# Patient Record
Sex: MALE | Race: OTHER | NOT HISPANIC OR LATINO | Employment: FULL TIME | URBAN - METROPOLITAN AREA
[De-identification: names, ages, dates, MRNs, and addresses within clinical notes are randomized per-mention and may not be internally consistent; named-entity substitution may affect disease eponyms.]

---

## 2024-10-02 ENCOUNTER — HOSPITAL ENCOUNTER (EMERGENCY)
Facility: HOSPITAL | Age: 34
Discharge: HOME | End: 2024-10-02
Attending: EMERGENCY MEDICINE
Payer: COMMERCIAL

## 2024-10-02 VITALS
BODY MASS INDEX: 23.58 KG/M2 | WEIGHT: 159.19 LBS | TEMPERATURE: 98 F | SYSTOLIC BLOOD PRESSURE: 122 MMHG | OXYGEN SATURATION: 100 % | HEART RATE: 72 BPM | RESPIRATION RATE: 18 BRPM | DIASTOLIC BLOOD PRESSURE: 64 MMHG | HEIGHT: 69 IN

## 2024-10-02 DIAGNOSIS — N20.0 KIDNEY STONE: ICD-10-CM

## 2024-10-02 DIAGNOSIS — R10.9 FLANK PAIN: Primary | ICD-10-CM

## 2024-10-02 LAB
ALBUMIN SERPL BCP-MCNC: 4.3 G/DL (ref 3.5–5.2)
ALP SERPL-CCNC: 77 U/L (ref 55–135)
ALT SERPL W/O P-5'-P-CCNC: 36 U/L (ref 10–44)
ANION GAP SERPL CALC-SCNC: 13 MMOL/L (ref 8–16)
AST SERPL-CCNC: 32 U/L (ref 10–40)
BASOPHILS # BLD AUTO: 0.02 K/UL (ref 0–0.2)
BASOPHILS NFR BLD: 0.3 % (ref 0–1.9)
BILIRUB SERPL-MCNC: 0.6 MG/DL (ref 0.1–1)
BILIRUB UR QL STRIP: NEGATIVE
BUN SERPL-MCNC: 24 MG/DL (ref 6–20)
CALCIUM SERPL-MCNC: 9.6 MG/DL (ref 8.7–10.5)
CHLORIDE SERPL-SCNC: 103 MMOL/L (ref 95–110)
CLARITY UR: CLEAR
CO2 SERPL-SCNC: 21 MMOL/L (ref 23–29)
COLOR UR: YELLOW
CREAT SERPL-MCNC: 1.3 MG/DL (ref 0.5–1.4)
DIFFERENTIAL METHOD BLD: ABNORMAL
EOSINOPHIL # BLD AUTO: 0.3 K/UL (ref 0–0.5)
EOSINOPHIL NFR BLD: 4.4 % (ref 0–8)
ERYTHROCYTE [DISTWIDTH] IN BLOOD BY AUTOMATED COUNT: 12 % (ref 11.5–14.5)
EST. GFR  (NO RACE VARIABLE): >60 ML/MIN/1.73 M^2
GLUCOSE SERPL-MCNC: 128 MG/DL (ref 70–110)
GLUCOSE UR QL STRIP: NEGATIVE
HCT VFR BLD AUTO: 42.8 % (ref 40–54)
HCV AB SERPL QL IA: NEGATIVE
HEP C VIRUS HOLD SPECIMEN: NORMAL
HGB BLD-MCNC: 14.6 G/DL (ref 14–18)
HGB UR QL STRIP: ABNORMAL
HIV 1+2 AB+HIV1 P24 AG SERPL QL IA: NEGATIVE
IMM GRANULOCYTES # BLD AUTO: 0.01 K/UL (ref 0–0.04)
IMM GRANULOCYTES NFR BLD AUTO: 0.1 % (ref 0–0.5)
KETONES UR QL STRIP: ABNORMAL
LEUKOCYTE ESTERASE UR QL STRIP: NEGATIVE
LYMPHOCYTES # BLD AUTO: 3.1 K/UL (ref 1–4.8)
LYMPHOCYTES NFR BLD: 45.2 % (ref 18–48)
MCH RBC QN AUTO: 31.6 PG (ref 27–31)
MCHC RBC AUTO-ENTMCNC: 34.1 G/DL (ref 32–36)
MCV RBC AUTO: 93 FL (ref 82–98)
MICROSCOPIC COMMENT: ABNORMAL
MONOCYTES # BLD AUTO: 0.4 K/UL (ref 0.3–1)
MONOCYTES NFR BLD: 5.6 % (ref 4–15)
NEUTROPHILS # BLD AUTO: 3 K/UL (ref 1.8–7.7)
NEUTROPHILS NFR BLD: 44.4 % (ref 38–73)
NITRITE UR QL STRIP: NEGATIVE
NRBC BLD-RTO: 0 /100 WBC
PH UR STRIP: 7 [PH] (ref 5–8)
PLATELET # BLD AUTO: 286 K/UL (ref 150–450)
PMV BLD AUTO: 9.4 FL (ref 9.2–12.9)
POTASSIUM SERPL-SCNC: 4.3 MMOL/L (ref 3.5–5.1)
PROT SERPL-MCNC: 8.2 G/DL (ref 6–8.4)
PROT UR QL STRIP: NEGATIVE
RBC # BLD AUTO: 4.62 M/UL (ref 4.6–6.2)
RBC #/AREA URNS HPF: 62 /HPF (ref 0–4)
SODIUM SERPL-SCNC: 137 MMOL/L (ref 136–145)
SP GR UR STRIP: 1.02 (ref 1–1.03)
URN SPEC COLLECT METH UR: ABNORMAL
UROBILINOGEN UR STRIP-ACNC: NEGATIVE EU/DL
WBC # BLD AUTO: 6.84 K/UL (ref 3.9–12.7)
WBC #/AREA URNS HPF: 2 /HPF (ref 0–5)

## 2024-10-02 PROCEDURE — 87389 HIV-1 AG W/HIV-1&-2 AB AG IA: CPT | Performed by: EMERGENCY MEDICINE

## 2024-10-02 PROCEDURE — 86803 HEPATITIS C AB TEST: CPT | Performed by: EMERGENCY MEDICINE

## 2024-10-02 PROCEDURE — 36415 COLL VENOUS BLD VENIPUNCTURE: CPT | Performed by: EMERGENCY MEDICINE

## 2024-10-02 PROCEDURE — 25000003 PHARM REV CODE 250: Performed by: EMERGENCY MEDICINE

## 2024-10-02 PROCEDURE — 85025 COMPLETE CBC W/AUTO DIFF WBC: CPT | Performed by: EMERGENCY MEDICINE

## 2024-10-02 PROCEDURE — 80053 COMPREHEN METABOLIC PANEL: CPT | Performed by: EMERGENCY MEDICINE

## 2024-10-02 PROCEDURE — 81000 URINALYSIS NONAUTO W/SCOPE: CPT | Performed by: EMERGENCY MEDICINE

## 2024-10-02 PROCEDURE — 96374 THER/PROPH/DIAG INJ IV PUSH: CPT

## 2024-10-02 PROCEDURE — 96361 HYDRATE IV INFUSION ADD-ON: CPT

## 2024-10-02 PROCEDURE — 63600175 PHARM REV CODE 636 W HCPCS: Performed by: EMERGENCY MEDICINE

## 2024-10-02 PROCEDURE — 99285 EMERGENCY DEPT VISIT HI MDM: CPT | Mod: 25

## 2024-10-02 PROCEDURE — 96375 TX/PRO/DX INJ NEW DRUG ADDON: CPT

## 2024-10-02 RX ORDER — MORPHINE SULFATE 4 MG/ML
4 INJECTION, SOLUTION INTRAMUSCULAR; INTRAVENOUS
Status: DISCONTINUED | OUTPATIENT
Start: 2024-10-02 | End: 2024-10-02 | Stop reason: HOSPADM

## 2024-10-02 RX ORDER — TAMSULOSIN HYDROCHLORIDE 0.4 MG/1
0.4 CAPSULE ORAL DAILY
Qty: 30 CAPSULE | Refills: 0 | Status: SHIPPED | OUTPATIENT
Start: 2024-10-02 | End: 2025-10-02

## 2024-10-02 RX ORDER — HYDROCODONE BITARTRATE AND ACETAMINOPHEN 5; 325 MG/1; MG/1
1 TABLET ORAL EVERY 4 HOURS PRN
Qty: 11 TABLET | Refills: 0 | Status: SHIPPED | OUTPATIENT
Start: 2024-10-02 | End: 2024-10-07

## 2024-10-02 RX ORDER — TAMSULOSIN HYDROCHLORIDE 0.4 MG/1
0.4 CAPSULE ORAL
Status: COMPLETED | OUTPATIENT
Start: 2024-10-02 | End: 2024-10-02

## 2024-10-02 RX ORDER — KETOROLAC TROMETHAMINE 30 MG/ML
15 INJECTION, SOLUTION INTRAMUSCULAR; INTRAVENOUS
Status: COMPLETED | OUTPATIENT
Start: 2024-10-02 | End: 2024-10-02

## 2024-10-02 RX ORDER — ONDANSETRON HYDROCHLORIDE 2 MG/ML
4 INJECTION, SOLUTION INTRAVENOUS
Status: COMPLETED | OUTPATIENT
Start: 2024-10-02 | End: 2024-10-02

## 2024-10-02 RX ORDER — MORPHINE SULFATE 4 MG/ML
4 INJECTION, SOLUTION INTRAMUSCULAR; INTRAVENOUS
Status: COMPLETED | OUTPATIENT
Start: 2024-10-02 | End: 2024-10-02

## 2024-10-02 RX ORDER — PROMETHAZINE HYDROCHLORIDE 25 MG/1
25 TABLET ORAL EVERY 6 HOURS PRN
Qty: 15 TABLET | Refills: 0 | Status: SHIPPED | OUTPATIENT
Start: 2024-10-02

## 2024-10-02 RX ADMIN — SODIUM CHLORIDE 1000 ML: 9 INJECTION, SOLUTION INTRAVENOUS at 11:10

## 2024-10-02 RX ADMIN — KETOROLAC TROMETHAMINE 15 MG: 30 INJECTION, SOLUTION INTRAMUSCULAR at 11:10

## 2024-10-02 RX ADMIN — MORPHINE SULFATE 4 MG: 4 INJECTION INTRAVENOUS at 12:10

## 2024-10-02 RX ADMIN — ONDANSETRON 4 MG: 2 INJECTION INTRAMUSCULAR; INTRAVENOUS at 11:10

## 2024-10-02 RX ADMIN — TAMSULOSIN HYDROCHLORIDE 0.4 MG: 0.4 CAPSULE ORAL at 01:10

## 2024-10-02 NOTE — ED PROVIDER NOTES
SCRIBE #1 NOTE: I, Sarah Garland, am scribing for, and in the presence of, Jaguar Aquino MD. I have scribed the entire note.       History     Chief Complaint   Patient presents with    Flank Pain     Patient presents to ED with c/o right sided flank pain that stated this morning. Patient also nauseous. Denies difficulty urinating.     Review of patient's allergies indicates:  No Known Allergies      History of Present Illness     HPI    10/2/2024, 11:43 AM  History obtained from the patient      History of Present Illness: Sandra Tafoya is a 34 y.o. male patient with an unknown PMHx who presents to the Emergency Department for evaluation of right-sided flank pain which onset 2 hours ago. Symptoms are constant and moderate in severity. No mitigating or exacerbating factors reported. Associated sxs include nausea, however, Pt denies any vomiting. No prior treatment specified. No further complaints or concerns at this time.       Arrival mode: Personal Transportation    PCP: Rekha, Primary Doctor        Past Medical History:  History reviewed. No pertinent past medical history.    Past Surgical History:  History reviewed. No pertinent surgical history.      Family History:  No family history on file.    Social History:  Social History     Tobacco Use    Smoking status: Never     Passive exposure: Never    Smokeless tobacco: Never   Substance and Sexual Activity    Alcohol use: Not on file    Drug use: Not Currently    Sexual activity: Not on file        Review of Systems     Review of Systems   Constitutional:  Negative for fever.   HENT:  Negative for sore throat.    Respiratory:  Negative for shortness of breath.    Cardiovascular:  Negative for chest pain.   Gastrointestinal:  Positive for nausea. Negative for vomiting.   Genitourinary:  Positive for flank pain (right-sided). Negative for dysuria.   Musculoskeletal:  Negative for back pain.   Skin:  Negative for rash.   Neurological:  Negative for weakness.  "  Hematological:  Does not bruise/bleed easily.   All other systems reviewed and are negative.     Physical Exam     Initial Vitals [10/02/24 1138]   BP Pulse Resp Temp SpO2   128/69 65 18 98.3 °F (36.8 °C) 100 %      MAP       --          Physical Exam  Nursing Notes and Vital Signs Reviewed.  Constitutional: Patient is in moderate distress. Well-developed and well-nourished.  Head: Atraumatic. Normocephalic.  Eyes: PERRL. EOM intact. Conjunctivae are not pale. No scleral icterus.  ENT: Mucous membranes are moist. Oropharynx is clear and symmetric.    Neck: Supple. Full ROM. No lymphadenopathy.  Cardiovascular: Regular rate. Regular rhythm. No murmurs, rubs, or gallops. Distal pulses are 2+ and symmetric.  Pulmonary/Chest: No respiratory distress. Clear to auscultation bilaterally. No wheezing or rales.  Abdominal: Soft and non-distended.  There is no tenderness.  No rebound, guarding, or rigidity. Good bowel sounds.  Genitourinary: No CVA tenderness  Musculoskeletal: Moves all extremities. No obvious deformities. No edema. No calf tenderness.  Skin: Warm and dry.  Neurological:  Alert, awake, and appropriate.  Normal speech.  No acute focal neurological deficits are appreciated.  Psychiatric: Normal affect. Good eye contact. Appropriate in content.     ED Course   Procedures  ED Vital Signs:  Vitals:    10/02/24 1138 10/02/24 1149 10/02/24 1226 10/02/24 1258   BP: 128/69 (!) 120/57 112/69 129/67   Pulse: 65 (!) 58 (!) 59 (!) 58   Resp: 18 16 18 16   Temp: 98.3 °F (36.8 °C) 98.2 °F (36.8 °C) 98.2 °F (36.8 °C) 98 °F (36.7 °C)   TempSrc: Oral Oral Oral Oral   SpO2: 100% 100%     Weight:  72.2 kg (159 lb 2.8 oz)     Height: 5' 9" (1.753 m) 5' 9" (1.753 m)      10/02/24 1330 10/02/24 1505   BP: 118/63 122/64   Pulse: 64 72   Resp: 16 18   Temp:  98.2 °F (36.8 °C)   TempSrc:  Oral   SpO2: 100% 100%   Weight:     Height:         Abnormal Lab Results:  Labs Reviewed   CBC W/ AUTO DIFFERENTIAL - Abnormal       Result Value "    WBC 6.84      RBC 4.62      Hemoglobin 14.6      Hematocrit 42.8      MCV 93      MCH 31.6 (*)     MCHC 34.1      RDW 12.0      Platelets 286      MPV 9.4      Immature Granulocytes 0.1      Gran # (ANC) 3.0      Immature Grans (Abs) 0.01      Lymph # 3.1      Mono # 0.4      Eos # 0.3      Baso # 0.02      nRBC 0      Gran % 44.4      Lymph % 45.2      Mono % 5.6      Eosinophil % 4.4      Basophil % 0.3      Differential Method Automated      Narrative:     Release to patient->Immediate   COMPREHENSIVE METABOLIC PANEL - Abnormal    Sodium 137      Potassium 4.3      Chloride 103      CO2 21 (*)     Glucose 128 (*)     BUN 24 (*)     Creatinine 1.3      Calcium 9.6      Total Protein 8.2      Albumin 4.3      Total Bilirubin 0.6      Alkaline Phosphatase 77      AST 32      ALT 36      eGFR >60      Anion Gap 13      Narrative:     Release to patient->Immediate   URINALYSIS, REFLEX TO URINE CULTURE - Abnormal    Specimen UA Urine, Clean Catch      Color, UA Yellow      Appearance, UA Clear      pH, UA 7.0      Specific Gravity, UA 1.020      Protein, UA Negative      Glucose, UA Negative      Ketones, UA 2+ (*)     Bilirubin (UA) Negative      Occult Blood UA 1+ (*)     Nitrite, UA Negative      Urobilinogen, UA Negative      Leukocytes, UA Negative      Narrative:     Specimen Source->Urine   URINALYSIS MICROSCOPIC - Abnormal    RBC, UA 62 (*)     WBC, UA 2      Microscopic Comment SEE COMMENT      Narrative:     Specimen Source->Urine   HIV 1 / 2 ANTIBODY    HIV 1/2 Ag/Ab Negative      Narrative:     Release to patient->Immediate   HEPATITIS C ANTIBODY    Hepatitis C Ab Negative      Narrative:     Release to patient->Immediate   HEP C VIRUS HOLD SPECIMEN    HEP C Virus Hold Specimen Hold for HCV sendout      Narrative:     Release to patient->Immediate        All Lab Results:  Results for orders placed or performed during the hospital encounter of 10/02/24   HIV 1/2 Ag/Ab (4th Gen)    Collection Time:  10/02/24 11:51 AM   Result Value Ref Range    HIV 1/2 Ag/Ab Negative Negative   Hepatitis C Antibody    Collection Time: 10/02/24 11:51 AM   Result Value Ref Range    Hepatitis C Ab Negative Negative   HCV Virus Hold Specimen    Collection Time: 10/02/24 11:51 AM   Result Value Ref Range    HEP C Virus Hold Specimen Hold for HCV sendout    CBC Auto Differential    Collection Time: 10/02/24 11:51 AM   Result Value Ref Range    WBC 6.84 3.90 - 12.70 K/uL    RBC 4.62 4.60 - 6.20 M/uL    Hemoglobin 14.6 14.0 - 18.0 g/dL    Hematocrit 42.8 40.0 - 54.0 %    MCV 93 82 - 98 fL    MCH 31.6 (H) 27.0 - 31.0 pg    MCHC 34.1 32.0 - 36.0 g/dL    RDW 12.0 11.5 - 14.5 %    Platelets 286 150 - 450 K/uL    MPV 9.4 9.2 - 12.9 fL    Immature Granulocytes 0.1 0.0 - 0.5 %    Gran # (ANC) 3.0 1.8 - 7.7 K/uL    Immature Grans (Abs) 0.01 0.00 - 0.04 K/uL    Lymph # 3.1 1.0 - 4.8 K/uL    Mono # 0.4 0.3 - 1.0 K/uL    Eos # 0.3 0.0 - 0.5 K/uL    Baso # 0.02 0.00 - 0.20 K/uL    nRBC 0 0 /100 WBC    Gran % 44.4 38.0 - 73.0 %    Lymph % 45.2 18.0 - 48.0 %    Mono % 5.6 4.0 - 15.0 %    Eosinophil % 4.4 0.0 - 8.0 %    Basophil % 0.3 0.0 - 1.9 %    Differential Method Automated    Comprehensive Metabolic Panel    Collection Time: 10/02/24 11:51 AM   Result Value Ref Range    Sodium 137 136 - 145 mmol/L    Potassium 4.3 3.5 - 5.1 mmol/L    Chloride 103 95 - 110 mmol/L    CO2 21 (L) 23 - 29 mmol/L    Glucose 128 (H) 70 - 110 mg/dL    BUN 24 (H) 6 - 20 mg/dL    Creatinine 1.3 0.5 - 1.4 mg/dL    Calcium 9.6 8.7 - 10.5 mg/dL    Total Protein 8.2 6.0 - 8.4 g/dL    Albumin 4.3 3.5 - 5.2 g/dL    Total Bilirubin 0.6 0.1 - 1.0 mg/dL    Alkaline Phosphatase 77 55 - 135 U/L    AST 32 10 - 40 U/L    ALT 36 10 - 44 U/L    eGFR >60 >60 mL/min/1.73 m^2    Anion Gap 13 8 - 16 mmol/L   Urinalysis, Reflex to Urine Culture Urine, Clean Catch    Collection Time: 10/02/24 12:26 PM    Specimen: Urine   Result Value Ref Range    Specimen UA Urine, Clean Catch     Color, UA  Yellow Yellow, Straw, Nicole    Appearance, UA Clear Clear    pH, UA 7.0 5.0 - 8.0    Specific Gravity, UA 1.020 1.005 - 1.030    Protein, UA Negative Negative    Glucose, UA Negative Negative    Ketones, UA 2+ (A) Negative    Bilirubin (UA) Negative Negative    Occult Blood UA 1+ (A) Negative    Nitrite, UA Negative Negative    Urobilinogen, UA Negative <2.0 EU/dL    Leukocytes, UA Negative Negative   Urinalysis Microscopic    Collection Time: 10/02/24 12:26 PM   Result Value Ref Range    RBC, UA 62 (H) 0 - 4 /hpf    WBC, UA 2 0 - 5 /hpf    Microscopic Comment SEE COMMENT        Imaging Results:  Imaging Results              CT Renal Stone Study ABD Pelvis WO (Final result)  Result time 10/02/24 12:35:22      Final result by Rolly Valentino ([facility]), MD (10/02/24 12:35:22)                   Impression:      3 mm stone right UV junction with mild hydronephrosis.  Several other small nonobstructing renal stones bilaterally.    All CT scans at this facility use dose modulation, iterative reconstructions, and/or weight base dosing when appropriate to reduce radiation dose to as low as reasonably achievable      Electronically signed by: Rolly Valentino MD  Date:    10/02/2024  Time:    12:35               Narrative:    EXAMINATION:  CT RENAL STONE STUDY ABD PELVIS WO    CLINICAL HISTORY:  Flank pain, kidney stone suspected;    TECHNIQUE:  Noncontrast images were obtained.    COMPARISON:  None    FINDINGS:  Lung bases are clear    The liver, the spleen, and the pancreas appear normal.    The gallbladder is unremarkable.  There is no bile duct dilatation.    There are few punctate nonobstructing renal stones bilaterally.  There is mild hydronephrosis and stranding of the periureteral fat on the right secondary to a stone measuring 3 mm at the right UV junction.    The aorta and inferior vena cava are unremarkable.    There are no acute bowel abnormalities.   Normal appendix.    Bladder is normal. No abnormal masses or  fluid collections in the pelvis.    Skeletal structures are intact.  No acute skeletal findings.                                           The Emergency Provider reviewed the vital signs and test results, which are outlined above.     ED Discussion     12:20 PM: Re-evaluated pt. Patient is resting comfortably and is in no acute distress. Discussed with pt all pertinent results. Discussed with pt any concerns expressed at this time. Answered all questions. Pt expresses understanding at this time.    1:25 PM: Reassessed pt at this time. Discussed with patient and/or family/caretaker all pertinent ED information and results. Discussed pt dx and plan of tx. Gave patient all f/u and return to the ED instructions. All questions and concerns were addressed at this time. Patient and/or family/caretaker expresses understanding of information and instructions, and is comfortable with plan to discharge. Pt is stable for discharge.    I discussed with patient and/or family/caretaker that evaluation in the ED does not suggest any emergent or life threatening medical conditions requiring immediate intervention beyond what was provided in the ED, and I believe patient is safe for discharge.  Regardless, an unremarkable evaluation in the ED does not preclude the development or presence of a serious of life threatening condition. As such, patient was instructed to return immediately for any worsening or change in current symptoms.         Medical Decision Making  DDx: Flank pain, kidney stone    Amount and/or Complexity of Data Reviewed  Labs: ordered. Decision-making details documented in ED Course.  Radiology: ordered. Decision-making details documented in ED Course.  Discussion of management or test interpretation with external provider(s): Feeling better after treatment in the ED, and ready to go home.  Kidney stone seen on CT.  BUN 24.  Fluids and pain meds given, feeling better.  Rx flomax, norco and phenergan.  Follow up with  Urologist    Risk  Prescription drug management.                ED Medication(s):  Medications   sodium chloride 0.9% bolus 1,000 mL 1,000 mL (0 mLs Intravenous Stopped 10/2/24 1256)   ketorolac injection 15 mg (15 mg Intravenous Given 10/2/24 1156)   ondansetron injection 4 mg (4 mg Intravenous Given 10/2/24 1155)   morphine injection 4 mg (4 mg Intravenous Given 10/2/24 1228)   tamsulosin 24 hr capsule 0.4 mg (0.4 mg Oral Given 10/2/24 1357)       Discharge Medication List as of 10/2/2024  1:13 PM        START taking these medications    Details   HYDROcodone-acetaminophen (NORCO) 5-325 mg per tablet Take 1 tablet by mouth every 4 (four) hours as needed for Pain., Starting Wed 10/2/2024, Until Mon 10/7/2024 at 2359, Normal      promethazine (PHENERGAN) 25 MG tablet Take 1 tablet (25 mg total) by mouth every 6 (six) hours as needed for Nausea., Starting Wed 10/2/2024, Normal      tamsulosin (FLOMAX) 0.4 mg Cap Take 1 capsule (0.4 mg total) by mouth once daily., Starting Wed 10/2/2024, Until Thu 10/2/2025, Normal              Follow-up Information       Urology.    Contact information:  [phone]                               Scribe Attestation:   Scribe #1: I performed the above scribed service and the documentation accurately describes the services I performed. I attest to the accuracy of the note.     Attending:   Physician Attestation Statement for Scribe #1: I, Jaguar Aquino MD, personally performed the services described in this documentation, as scribed by Sarah Garland, in my presence, and it is both accurate and complete.           Clinical Impression       ICD-10-CM ICD-9-CM   1. Flank pain  R10.9 789.09   2. Kidney stone  N20.0 592.0       Disposition:   Disposition: Discharged  Condition: Stable       Jaguar Aquino MD  10/02/24 2143       Jaguar Aquino MD  10/03/24 3108

## 2024-10-07 ENCOUNTER — OFFICE VISIT (OUTPATIENT)
Dept: UROLOGY | Facility: CLINIC | Age: 34
End: 2024-10-07
Payer: COMMERCIAL

## 2024-10-07 VITALS — RESPIRATION RATE: 16 BRPM | HEIGHT: 69 IN | BODY MASS INDEX: 23.51 KG/M2

## 2024-10-07 DIAGNOSIS — N20.1 URETERAL CALCULUS, RIGHT: Primary | ICD-10-CM

## 2024-10-07 DIAGNOSIS — N46.9 INFERTILITY MALE: ICD-10-CM

## 2024-10-07 PROCEDURE — 1159F MED LIST DOCD IN RCRD: CPT | Mod: CPTII,S$GLB,, | Performed by: UROLOGY

## 2024-10-07 PROCEDURE — 99999 PR PBB SHADOW E&M-EST. PATIENT-LVL III: CPT | Mod: PBBFAC,,, | Performed by: UROLOGY

## 2024-10-07 PROCEDURE — 3008F BODY MASS INDEX DOCD: CPT | Mod: CPTII,S$GLB,, | Performed by: UROLOGY

## 2024-10-07 PROCEDURE — 99204 OFFICE O/P NEW MOD 45 MIN: CPT | Mod: S$GLB,,, | Performed by: UROLOGY

## 2024-10-07 NOTE — PROGRESS NOTES
"Chief Complaint:   Encounter Diagnoses   Name Primary?    Infertility male     Ureteral calculus, right Yes       HPI:  HPI Sandra Tafoya bryan 34 y.o. male who presents with follow up from an ER visit for a kidney stone.  He passed a 3 mm stone the day after he was seen in the ER.  First time he has ever had stones.  He states his pain has improved.  He also brings up the fact that he is  and has been visiting his wife in the [facility] for 3 weeks once a year for 5 years and they have been unable to conceive.  His wife requests he get a semen analysis.  He has good libido and no erectile dysfunction.    History:  Social History     Tobacco Use    Smoking status: Never     Passive exposure: Never    Smokeless tobacco: Never   Substance Use Topics    Drug use: Not Currently     History reviewed. No pertinent past medical history.  History reviewed. No pertinent surgical history.  No family history on file.    Current Outpatient Medications on File Prior to Visit   Medication Sig Dispense Refill    HYDROcodone-acetaminophen (NORCO) 5-325 mg per tablet Take 1 tablet by mouth every 4 (four) hours as needed for Pain. (Patient not taking: Reported on 10/7/2024) 11 tablet 0    promethazine (PHENERGAN) 25 MG tablet Take 1 tablet (25 mg total) by mouth every 6 (six) hours as needed for Nausea. (Patient not taking: Reported on 10/7/2024) 15 tablet 0    tamsulosin (FLOMAX) 0.4 mg Cap Take 1 capsule (0.4 mg total) by mouth once daily. (Patient not taking: Reported on 10/7/2024) 30 capsule 0     No current facility-administered medications on file prior to visit.        Objective:     Vitals:    10/07/24 1501   Resp: 16   Height: 5' 9" (1.753 m)      BMI Readings from Last 1 Encounters:   10/07/24 23.51 kg/m²          Physical Exam    No acute distress alert and oriented   Respirations even unlabored   Abdomen is soft nontender    CT was independently reviewed and shows bilateral 1 mm renal stones.  Lab Results "   Component Value Date    CREATININE 1.3 10/02/2024      Assessment:       1. Ureteral calculus, right    2. Infertility male        Plan:     1. Ureteral calculus, right    2. Infertility male       Orders Placed This Encounter    Semen Analysis      Right ureteral stones spontaneously passed.  Discussed stone prevention.  He does have bilateral renal stones.  Discussed infertility.  We will order semen analysis per his request.  Notify that he would need evaluation by male infertility specialist if the parameters came back abnormal.